# Patient Record
Sex: MALE | Race: BLACK OR AFRICAN AMERICAN | NOT HISPANIC OR LATINO | Employment: OTHER | URBAN - METROPOLITAN AREA
[De-identification: names, ages, dates, MRNs, and addresses within clinical notes are randomized per-mention and may not be internally consistent; named-entity substitution may affect disease eponyms.]

---

## 2019-10-31 DIAGNOSIS — M62.81 MUSCLE WEAKNESS (GENERALIZED): Primary | ICD-10-CM

## 2020-08-07 ENCOUNTER — TELEPHONE (OUTPATIENT)
Dept: NEUROLOGY | Facility: CLINIC | Age: 50
End: 2020-08-07

## 2020-08-07 NOTE — TELEPHONE ENCOUNTER
----- Message from Malathi Hagen sent at 8/7/2020 11:12 AM CDT -----  Patient states referral for neurology was sent over past Monday or Tuesday - doesn't show in computer yet    He is asking if one has been received  - says was not sent to a designated physician only to Neurology department      Patient can be reached @# [phone]

## 2020-10-12 ENCOUNTER — TELEPHONE (OUTPATIENT)
Dept: NEUROLOGY | Facility: CLINIC | Age: 50
End: 2020-10-12

## 2020-10-12 NOTE — TELEPHONE ENCOUNTER
Spoke with patient, appointment rescheduled to 11/4/20 at 1300 hours.  He informed me tremors are mostly in left hand after suffering with a stroke.  If he holds a glass he can see it more.  Confirmed date/time with patient.   107

## 2020-10-12 NOTE — TELEPHONE ENCOUNTER
----- Message from Mima Cai sent at 10/12/2020 11:22 AM CDT -----  Type: Patient Call Back    Who called: self    What is the request in detail: pt was calling to get address and I noticed that a reschedule message was in. I can't reschedule because it's not giving me any appts    Can the clinic reply by [facility]?no    Would the patient rather a call back or a response via [facility]? call    Best call back number

## 2020-11-03 ENCOUNTER — HOSPITAL ENCOUNTER (OUTPATIENT)
Facility: HOSPITAL | Age: 50
Discharge: HOME | End: 2020-11-05
Attending: EMERGENCY MEDICINE | Admitting: SURGERY
Payer: MEDICARE

## 2020-11-03 DIAGNOSIS — S36.112A LIVER HEMATOMA, INITIAL ENCOUNTER: ICD-10-CM
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
## 2020-11-03 DIAGNOSIS — S22.41XA CLOSED FRACTURE OF MULTIPLE RIBS OF RIGHT SIDE, INITIAL ENCOUNTER: Primary | ICD-10-CM

## 2020-11-03 DIAGNOSIS — S27.2XXA TRAUMATIC PNEUMOHEMOTHORAX, INITIAL ENCOUNTER: ICD-10-CM
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
## 2020-11-03 DIAGNOSIS — Z86.73 HISTORY OF STROKE: ICD-10-CM

## 2020-11-03 LAB
ANION GAP SERPL CALC-SCNC: 10 MMOL/L (ref 8–16)
BASOPHILS # BLD AUTO: 0.01 K/UL (ref 0–0.2)
BASOPHILS NFR BLD: 0.1 % (ref 0–1.9)
BUN SERPL-MCNC: 16 MG/DL (ref 6–20)
CALCIUM SERPL-MCNC: 9.5 MG/DL (ref 8.7–10.5)
CHLORIDE SERPL-SCNC: 104 MMOL/L (ref 95–110)
CO2 SERPL-SCNC: 27 MMOL/L (ref 23–29)
CREAT SERPL-MCNC: 1 MG/DL (ref 0.5–1.4)
DIFFERENTIAL METHOD: ABNORMAL
EOSINOPHIL # BLD AUTO: 0 K/UL (ref 0–0.5)
EOSINOPHIL NFR BLD: 0.5 % (ref 0–8)
ERYTHROCYTE [DISTWIDTH] IN BLOOD BY AUTOMATED COUNT: 16.2 % (ref 11.5–14.5)
EST. GFR  (AFRICAN AMERICAN): >60 ML/MIN/1.73 M^2
EST. GFR  (NON AFRICAN AMERICAN): >60 ML/MIN/1.73 M^2
GLUCOSE SERPL-MCNC: 113 MG/DL (ref 70–110)
HCT VFR BLD AUTO: 39.4 % (ref 40–54)
HGB BLD-MCNC: 12.3 G/DL (ref 14–18)
IMM GRANULOCYTES # BLD AUTO: 0.04 K/UL (ref 0–0.04)
IMM GRANULOCYTES NFR BLD AUTO: 0.5 % (ref 0–0.5)
LYMPHOCYTES # BLD AUTO: 0.6 K/UL (ref 1–4.8)
LYMPHOCYTES NFR BLD: 7.8 % (ref 18–48)
MCH RBC QN AUTO: 24.5 PG (ref 27–31)
MCHC RBC AUTO-ENTMCNC: 31.2 G/DL (ref 32–36)
MCV RBC AUTO: 79 FL (ref 82–98)
MONOCYTES # BLD AUTO: 0.6 K/UL (ref 0.3–1)
MONOCYTES NFR BLD: 7.2 % (ref 4–15)
NEUTROPHILS # BLD AUTO: 6.5 K/UL (ref 1.8–7.7)
NEUTROPHILS NFR BLD: 83.9 % (ref 38–73)
NRBC BLD-RTO: 0 /100 WBC
PLATELET # BLD AUTO: 227 K/UL (ref 150–350)
PMV BLD AUTO: 11.7 FL (ref 9.2–12.9)
POTASSIUM SERPL-SCNC: 3.5 MMOL/L (ref 3.5–5.1)
RBC # BLD AUTO: 5.02 M/UL (ref 4.6–6.2)
SODIUM SERPL-SCNC: 141 MMOL/L (ref 136–145)
WBC # BLD AUTO: 7.69 K/UL (ref 3.9–12.7)

## 2020-11-03 PROCEDURE — 99285 EMERGENCY DEPT VISIT HI MDM: CPT | Mod: ,,, | Performed by: PHYSICIAN ASSISTANT

## 2020-11-03 PROCEDURE — 94761 N-INVAS EAR/PLS OXIMETRY MLT: CPT | Mod: HCWC

## 2020-11-03 PROCEDURE — 96374 THER/PROPH/DIAG INJ IV PUSH: CPT | Mod: HCWC,59

## 2020-11-03 PROCEDURE — 99285 PR EMERGENCY DEPT VISIT,LEVEL V: ICD-10-PCS | Mod: ,,, | Performed by: PHYSICIAN ASSISTANT

## 2020-11-03 PROCEDURE — 99900035 HC TECH TIME PER 15 MIN (STAT): Mod: HCWC

## 2020-11-03 PROCEDURE — 94799 UNLISTED PULMONARY SVC/PX: CPT | Mod: HCWC

## 2020-11-03 PROCEDURE — 63600175 PHARM REV CODE 636 W HCPCS: Mod: HCWC | Performed by: PHYSICIAN ASSISTANT

## 2020-11-03 PROCEDURE — 85025 COMPLETE CBC W/AUTO DIFF WBC: CPT | Mod: HCWC

## 2020-11-03 PROCEDURE — 80048 BASIC METABOLIC PNL TOTAL CA: CPT | Mod: HCWC

## 2020-11-03 PROCEDURE — 99285 EMERGENCY DEPT VISIT HI MDM: CPT | Mod: 25,HCWC

## 2020-11-03 RX ORDER — LIDOCAINE 50 MG/G
1 PATCH TOPICAL
Status: COMPLETED | OUTPATIENT
Start: 2020-11-03 | End: 2020-11-04

## 2020-11-03 RX ORDER — MORPHINE SULFATE 4 MG/ML
4 INJECTION, SOLUTION INTRAMUSCULAR; INTRAVENOUS
Status: COMPLETED | OUTPATIENT
Start: 2020-11-03 | End: 2020-11-03

## 2020-11-03 RX ADMIN — IOHEXOL 100 ML: 350 INJECTION, SOLUTION INTRAVENOUS at 11:11

## 2020-11-03 RX ADMIN — MORPHINE SULFATE 4 MG: 4 INJECTION INTRAVENOUS at 10:11

## 2020-11-04 PROBLEM — S22.41XA MULTIPLE CLOSED FRACTURES OF RIBS OF RIGHT SIDE: Status: ACTIVE | Noted: 2020-11-04

## 2020-11-04 PROBLEM — S22.41XA CLOSED FRACTURE OF MULTIPLE RIBS OF RIGHT SIDE: Status: ACTIVE | Noted: 2020-11-04

## 2020-11-04 LAB
BILIRUB UR QL STRIP: NEGATIVE
CLARITY UR REFRACT.AUTO: CLEAR
COLOR UR AUTO: YELLOW
CTP QC/QA: YES
GLUCOSE UR QL STRIP: NEGATIVE
HGB UR QL STRIP: NEGATIVE
KETONES UR QL STRIP: ABNORMAL
LEUKOCYTE ESTERASE UR QL STRIP: NEGATIVE
NITRITE UR QL STRIP: NEGATIVE
PH UR STRIP: 5 [PH] (ref 5–8)
PROT UR QL STRIP: NEGATIVE
SARS-COV-2 RDRP RESP QL NAA+PROBE: NEGATIVE
SP GR UR STRIP: >1.03 (ref 1–1.03)
URN SPEC COLLECT METH UR: ABNORMAL

## 2020-11-04 PROCEDURE — 27100171 HC OXYGEN HIGH FLOW UP TO 24 HOURS: Mod: HCWC

## 2020-11-04 PROCEDURE — 94761 N-INVAS EAR/PLS OXIMETRY MLT: CPT | Mod: HCWC

## 2020-11-04 PROCEDURE — 99218 PR INITIAL OBSERVATION CARE,LEVL I: CPT | Mod: HCWC,,, | Performed by: SURGERY

## 2020-11-04 PROCEDURE — 96361 HYDRATE IV INFUSION ADD-ON: CPT | Mod: HCWC

## 2020-11-04 PROCEDURE — G0378 HOSPITAL OBSERVATION PER HR: HCPCS | Mod: HCWC

## 2020-11-04 PROCEDURE — 96372 THER/PROPH/DIAG INJ SC/IM: CPT

## 2020-11-04 PROCEDURE — 27000221 HC OXYGEN, UP TO 24 HOURS: Mod: HCWC

## 2020-11-04 PROCEDURE — 94664 DEMO&/EVAL PT USE INHALER: CPT | Mod: HCWC

## 2020-11-04 PROCEDURE — 99900035 HC TECH TIME PER 15 MIN (STAT): Mod: HCWC

## 2020-11-04 PROCEDURE — 25000003 PHARM REV CODE 250: Mod: HCWC | Performed by: PHYSICIAN ASSISTANT

## 2020-11-04 PROCEDURE — 27000646 HC AEROBIKA DEVICE: Mod: HCWC

## 2020-11-04 PROCEDURE — 96375 TX/PRO/DX INJ NEW DRUG ADDON: CPT

## 2020-11-04 PROCEDURE — 99218 PR INITIAL OBSERVATION CARE,LEVL I: ICD-10-PCS | Mod: HCWC,,, | Performed by: SURGERY

## 2020-11-04 PROCEDURE — 25000003 PHARM REV CODE 250: Mod: HCWC | Performed by: STUDENT IN AN ORGANIZED HEALTH CARE EDUCATION/TRAINING PROGRAM

## 2020-11-04 PROCEDURE — 25500020 PHARM REV CODE 255: Mod: HCWC | Performed by: EMERGENCY MEDICINE

## 2020-11-04 PROCEDURE — 63600175 PHARM REV CODE 636 W HCPCS: Mod: HCWC | Performed by: STUDENT IN AN ORGANIZED HEALTH CARE EDUCATION/TRAINING PROGRAM

## 2020-11-04 PROCEDURE — 81003 URINALYSIS AUTO W/O SCOPE: CPT | Mod: HCWC

## 2020-11-04 PROCEDURE — U0002 COVID-19 LAB TEST NON-CDC: HCPCS | Mod: HCWC | Performed by: PHYSICIAN ASSISTANT

## 2020-11-04 RX ORDER — OXYCODONE HYDROCHLORIDE 10 MG/1
10 TABLET ORAL EVERY 4 HOURS PRN
Status: DISCONTINUED | OUTPATIENT
Start: 2020-11-04 | End: 2020-11-05 | Stop reason: HOSPADM

## 2020-11-04 RX ORDER — SODIUM CHLORIDE 0.9 % (FLUSH) 0.9 %
10 SYRINGE (ML) INJECTION
Status: DISCONTINUED | OUTPATIENT
Start: 2020-11-04 | End: 2020-11-05 | Stop reason: HOSPADM

## 2020-11-04 RX ORDER — OXYCODONE HYDROCHLORIDE 5 MG/1
5 TABLET ORAL EVERY 4 HOURS PRN
Status: DISCONTINUED | OUTPATIENT
Start: 2020-11-04 | End: 2020-11-05 | Stop reason: HOSPADM

## 2020-11-04 RX ORDER — ACETAMINOPHEN 325 MG/1
650 TABLET ORAL EVERY 8 HOURS PRN
Status: DISCONTINUED | OUTPATIENT
Start: 2020-11-04 | End: 2020-11-05 | Stop reason: HOSPADM

## 2020-11-04 RX ORDER — SODIUM CHLORIDE, SODIUM LACTATE, POTASSIUM CHLORIDE, CALCIUM CHLORIDE 600; 310; 30; 20 MG/100ML; MG/100ML; MG/100ML; MG/100ML
INJECTION, SOLUTION INTRAVENOUS CONTINUOUS
Status: DISCONTINUED | OUTPATIENT
Start: 2020-11-04 | End: 2020-11-04

## 2020-11-04 RX ORDER — GABAPENTIN 300 MG/1
300 CAPSULE ORAL 3 TIMES DAILY
Status: DISCONTINUED | OUTPATIENT
Start: 2020-11-04 | End: 2020-11-05 | Stop reason: HOSPADM

## 2020-11-04 RX ORDER — DEXTROSE MONOHYDRATE, SODIUM CHLORIDE, AND POTASSIUM CHLORIDE 50; 1.49; 4.5 G/1000ML; G/1000ML; G/1000ML
INJECTION, SOLUTION INTRAVENOUS CONTINUOUS
Status: DISCONTINUED | OUTPATIENT
Start: 2020-11-04 | End: 2020-11-04

## 2020-11-04 RX ORDER — ENOXAPARIN SODIUM 100 MG/ML
30 INJECTION SUBCUTANEOUS EVERY 12 HOURS
Status: DISCONTINUED | OUTPATIENT
Start: 2020-11-04 | End: 2020-11-05 | Stop reason: HOSPADM

## 2020-11-04 RX ORDER — ONDANSETRON 8 MG/1
8 TABLET, ORALLY DISINTEGRATING ORAL EVERY 8 HOURS PRN
Status: DISCONTINUED | OUTPATIENT
Start: 2020-11-04 | End: 2020-11-05 | Stop reason: HOSPADM

## 2020-11-04 RX ORDER — ACETAMINOPHEN 325 MG/1
650 TABLET ORAL EVERY 6 HOURS
Status: DISCONTINUED | OUTPATIENT
Start: 2020-11-04 | End: 2020-11-05 | Stop reason: HOSPADM

## 2020-11-04 RX ORDER — TALC
6 POWDER (GRAM) TOPICAL NIGHTLY PRN
Status: DISCONTINUED | OUTPATIENT
Start: 2020-11-04 | End: 2020-11-05 | Stop reason: HOSPADM

## 2020-11-04 RX ORDER — KETOROLAC TROMETHAMINE 10 MG/1
10 TABLET, FILM COATED ORAL EVERY 6 HOURS
Status: DISCONTINUED | OUTPATIENT
Start: 2020-11-04 | End: 2020-11-05 | Stop reason: HOSPADM

## 2020-11-04 RX ORDER — METHOCARBAMOL 500 MG/1
500 TABLET, FILM COATED ORAL 3 TIMES DAILY
Status: DISCONTINUED | OUTPATIENT
Start: 2020-11-04 | End: 2020-11-05 | Stop reason: HOSPADM

## 2020-11-04 RX ADMIN — ACETAMINOPHEN 650 MG: 325 TABLET ORAL at 11:11

## 2020-11-04 RX ADMIN — GABAPENTIN 300 MG: 300 CAPSULE ORAL at 08:11

## 2020-11-04 RX ADMIN — ACETAMINOPHEN 650 MG: 325 TABLET ORAL at 07:11

## 2020-11-04 RX ADMIN — ENOXAPARIN SODIUM 30 MG: 30 INJECTION SUBCUTANEOUS at 08:11

## 2020-11-04 RX ADMIN — ACETAMINOPHEN 650 MG: 325 TABLET ORAL at 05:11

## 2020-11-04 RX ADMIN — METHOCARBAMOL 500 MG: 500 TABLET ORAL at 08:11

## 2020-11-04 RX ADMIN — LIDOCAINE 1 PATCH: 50 PATCH TOPICAL at 12:11

## 2020-11-04 RX ADMIN — ENOXAPARIN SODIUM 30 MG: 30 INJECTION SUBCUTANEOUS at 09:11

## 2020-11-04 RX ADMIN — GABAPENTIN 300 MG: 300 CAPSULE ORAL at 04:11

## 2020-11-04 RX ADMIN — METHOCARBAMOL 500 MG: 500 TABLET ORAL at 09:11

## 2020-11-04 RX ADMIN — SODIUM CHLORIDE, SODIUM LACTATE, POTASSIUM CHLORIDE, AND CALCIUM CHLORIDE: .6; .31; .03; .02 INJECTION, SOLUTION INTRAVENOUS at 04:11

## 2020-11-04 RX ADMIN — KETOROLAC TROMETHAMINE 10 MG: 10 TABLET, FILM COATED ORAL at 11:11

## 2020-11-04 RX ADMIN — KETOROLAC TROMETHAMINE 10 MG: 10 TABLET, FILM COATED ORAL at 05:11

## 2020-11-04 RX ADMIN — POTASSIUM CHLORIDE, DEXTROSE MONOHYDRATE AND SODIUM CHLORIDE: 150; 5; 450 INJECTION, SOLUTION INTRAVENOUS at 08:11

## 2020-11-04 RX ADMIN — METHOCARBAMOL 500 MG: 500 TABLET ORAL at 04:11

## 2020-11-04 RX ADMIN — SODIUM CHLORIDE 1000 ML: 0.9 INJECTION, SOLUTION INTRAVENOUS at 12:11

## 2020-11-04 RX ADMIN — METHOCARBAMOL 500 MG: 100 INJECTION INTRAMUSCULAR; INTRAVENOUS at 04:11

## 2020-11-04 RX ADMIN — GABAPENTIN 300 MG: 300 CAPSULE ORAL at 09:11

## 2020-11-04 NOTE — CONSULTS
[facility]  General Surgery  Consult Note    Patient Name: Jalen Iniguez  MRN: [id number]  Code Status: Full Code  Admission Date: 11/3/2020  Hospital Length of Stay: 0 days  Attending Physician: No att. providers found  Primary Care Provider: Primary Doctor No    Patient information was obtained from patient, past medical records and ER records.     Inpatient consult to General surgery  Consult performed by: Ajit Whittington MD  Consult ordered by: Ajit Whittington MD  Reason for consult: Rib fractures        Subjective:     Principal Problem: Multiple closed fractures of ribs of right side    History of Present Illness: Patient is 49 yo M with history of HTN and CVA (2017) with mild residual right sided weakness who presented to ED tonight after falling getting out of bath tube and developing right sided chest pain. Denies any head trauma or LOC. Denies any other injuries. Is not on blood thinners.    CT A/P showed a very low volume right hemopneumothorax    Patient cares for himself and his mother at home    Hx of lumbar spinal surgery  Chris any abd or throacic surgeries    No current facility-administered medications on file prior to encounter.      Current Outpatient Medications on File Prior to Encounter   Medication Sig    oxycodone-acetaminophen 5-325 mg (PERCOCET) 5-325 mg per tablet Take 1 tablet by mouth every 4 (four) hours as needed for Pain (no driving or operating machinery on this medication).    permethrin (ELIMITE) 5 % cream Apply to affected entire body once at night before bed - leave on 8 hours then shower off       Review of patient's allergies indicates:  No Known Allergies    Past Medical History:   Diagnosis Date    Psoriasis     Stroke 04/2017     Past Surgical History:   Procedure Laterality Date    KNEE SURGERY       Family History     None        Tobacco Use    Smoking status: Current Every Day Smoker     Packs/day: 1.00     Types: Cigarettes   Substance  and Sexual Activity    Alcohol use: No    Drug use: No    Sexual activity: Not on file     Review of Systems   Constitutional: Negative for chills and fever.   HENT: Negative for sinus pain and trouble swallowing.    Eyes: Negative for discharge and redness.   Respiratory: Negative for cough and shortness of breath.    Cardiovascular: Positive for chest pain.   Gastrointestinal: Negative for abdominal distention, abdominal pain, constipation, diarrhea, nausea and vomiting.   Genitourinary: Negative for dysuria.   Musculoskeletal: Negative for back pain and neck pain.   Skin: Negative for color change.   Allergic/Immunologic: Negative for immunocompromised state.   Neurological: Positive for weakness. Negative for light-headedness and headaches.   Hematological: Negative for adenopathy.     Objective:     Vital Signs (Most Recent):  Temp: 98.8 °F (37.1 °C) (11/04/20 0202)  Pulse: 97 (11/04/20 0202)  Resp: (!) 21 (11/04/20 0131)  BP: 134/76 (11/04/20 0202)  SpO2: 100 % (11/04/20 0202) Vital Signs (24h Range):  Temp:  [97.9 °F (36.6 °C)-98.8 °F (37.1 °C)] 98.8 °F (37.1 °C)  Pulse:  [84-97] 97  Resp:  [16-21] 21  SpO2:  [99 %-100 %] 100 %  BP: (134-142)/(62-76) 134/76     Weight: 108.9 kg (240 lb)  Body mass index is 30.81 kg/m².    Physical Exam  Vitals signs and nursing note reviewed.   Constitutional:       General: He is not in acute distress.     Appearance: Normal appearance. He is well-developed. He is not ill-appearing, toxic-appearing or diaphoretic.   HENT:      Head: Normocephalic and atraumatic.      Right Ear: External ear normal.      Left Ear: External ear normal.   Eyes:      Extraocular Movements: Extraocular movements intact.      Pupils: Pupils are equal, round, and reactive to light.   Neck:      Musculoskeletal: Normal range of motion and neck supple.   Cardiovascular:      Rate and Rhythm: Normal rate and regular rhythm.      Pulses: Normal pulses.   Pulmonary:      Effort: Pulmonary effort is  normal. No respiratory distress.      Comments: Right lower chest wall tenderness, no crepitus or flail chest noted  Chest:      Chest wall: Tenderness present.   Abdominal:      General: Abdomen is flat. There is no distension.      Palpations: Abdomen is soft.      Tenderness: There is no abdominal tenderness. There is no guarding.      Comments: No bruising noted, abd soft, non tender, non distended   Musculoskeletal: Normal range of motion.         General: No swelling, tenderness, deformity or signs of injury.      Comments: RUE/RLE 4/5 weakness   Skin:     General: Skin is warm and dry.   Neurological:      General: No focal deficit present.      Mental Status: He is alert and oriented to person, place, and time. Mental status is at baseline.      Cranial Nerves: No cranial nerve deficit.      Sensory: No sensory deficit.      Comments: No spinal tenderness noted throughout   Psychiatric:         Mood and Affect: Mood normal.         Behavior: Behavior normal.         Thought Content: Thought content normal.         Judgment: Judgment normal.         Significant Labs:  CBC:   Recent Labs   Lab 11/03/20  2247   WBC 7.69   RBC 5.02   HGB 12.3*   HCT 39.4*      MCV 79*   MCH 24.5*   MCHC 31.2*     CMP:   Recent Labs   Lab 11/03/20  2247   *   CALCIUM 9.5      K 3.5   CO2 27      BUN 16   CREATININE 1.0       Significant Diagnostics:  I have reviewed all pertinent imaging results/findings within the past 24 hours.   CT reviewed showed displaced rib fractures of right ribs 8-10 all with anterior/lateral and posterior rib fractures, as well as single non-displaced fractures of ribs 7 and 11, very small volume hemopneumothorax on right, no solid organ injury or free fluid or air noted in abd    Assessment/Plan:     * Multiple closed fractures of ribs of right side  Patient is 49 yo M with right rib fractures 7-11 with ribs 8-10 having two fracture points and jacky displacement at the  anterior/lateral fracture    Will admit to general surgery  CT chest non con ordered with 3D reconstruction to eval for other fractures  NPO  IVF  Multimodal pain control  Will check another CXR in the AM to eval for any pneumo         VTE Risk Mitigation (From admission, onward)         Ordered     enoxaparin injection 30 mg  Every 12 hours      11/04/20 0246     Place REMY hose  Until discontinued      11/04/20 0246     IP VTE HIGH RISK PATIENT  Once      11/04/20 0246     Place sequential compression device  Until discontinued      11/04/20 0246                Thank you for your consult. I will follow-up with patient. Please contact us if you have any additional questions.    Ajit Whittington MD  General Surgery  [facility]

## 2020-11-04 NOTE — PLAN OF CARE
CM to bedside - pt provided assessment info. Pt w/ rollator and shower chair in place, lives w/ mother. Pt will likely d/c home w/ no needs but may benefit from HH. Pt lives in a 1 story w/ 4 steps to enter and reports having no pets in the home. Pt reports a past stroke in April 2017. Pt's PCP added to chart. Pt uses [employer] rides for appts and states he has 1 left for the year. Pt's reports having a Neuro appt today w/ Dr Garry Robles at [facility] - no appt noted but there is an appt for 12/16 so it may have already been changed d/t admission. Pt also stated he has a dentist appt tmw at [facility] in [place] and any rescheduled appt is fine - CM contacted clinic and pt's new appt is 11/9 and placed in follow up. Pt does reports mod pain to his side and is currently on 4L o2.    CM provided patient anticipated NAIF which was written on the whiteboard and will be update by nursing staff.   Patient provided a Discharge Planning booklet. Patient verbalized understanding.    ALBA PEARCE [id number] - covering 5th floor ALBA Manjarrez [id number]     11/04/20 1405   Discharge Assessment   Assessment Type Discharge Planning Assessment   Confirmed/corrected address and phone number on facesheet? Yes   Assessment information obtained from? Patient   Expected Length of Stay (days) 2   Communicated expected length of stay with patient/caregiver yes   Prior to hospitilization cognitive status: Alert/Oriented   Prior to hospitalization functional status: Assistive Equipment   Current cognitive status: Alert/Oriented   Current Functional Status: Assistive Equipment;Needs Assistance   Facility Arrived From: N/A   Lives With parent(s)   Able to Return to Prior Arrangements yes   Is patient able to care for self after discharge? Unable to determine at this time (comments)   Who are your caregiver(s) and their phone number(s)? mother - Ankita [phone]; sister - Tamica [phone]   Patient's perception of discharge disposition home or  selfcare   Readmission Within the Last 30 Days no previous admission in last 30 days   Patient currently being followed by outpatient case management? No   Patient currently receives any other outside agency services? No   Equipment Currently Used at Home shower chair;rollator   Do you have any problems affording any of your prescribed medications? No   Is the patient taking medications as prescribed? yes   Does the patient have transportation home? Yes  (pt's sister will pickup pt at d/c)   Transportation Anticipated health plan transportation;family or friend will provide  ([employer] plan rides)   Dialysis Name and Scheduled days N/A   Does the patient receive services at the [facility]? No   Discharge Plan A Home with family   Discharge Plan B Home with family;Home Health   DME Needed Upon Discharge  other (see comments)  (TBD)   Patient/Family in Agreement with Plan yes

## 2020-11-04 NOTE — PLAN OF CARE
11/04/20 1403   Post-Acute Status   Post-Acute Authorization Other   Other Status No Post-Acute Service Needs   Discharge Delays None known at this time   Discharge Plan   Discharge Plan A Home with family   Discharge Plan B Home with family;Home Health

## 2020-11-04 NOTE — SUBJECTIVE & OBJECTIVE
No current facility-administered medications on file prior to encounter.      Current Outpatient Medications on File Prior to Encounter   Medication Sig    oxycodone-acetaminophen 5-325 mg (PERCOCET) 5-325 mg per tablet Take 1 tablet by mouth every 4 (four) hours as needed for Pain (no driving or operating machinery on this medication).    permethrin (ELIMITE) 5 % cream Apply to affected entire body once at night before bed - leave on 8 hours then shower off       Review of patient's allergies indicates:  No Known Allergies    Past Medical History:   Diagnosis Date    Psoriasis     Stroke 04/2017     Past Surgical History:   Procedure Laterality Date    KNEE SURGERY       Family History     None        Tobacco Use    Smoking status: Current Every Day Smoker     Packs/day: 1.00     Types: Cigarettes   Substance and Sexual Activity    Alcohol use: No    Drug use: No    Sexual activity: Not on file     Review of Systems   Constitutional: Negative for chills and fever.   HENT: Negative for sinus pain and trouble swallowing.    Eyes: Negative for discharge and redness.   Respiratory: Negative for cough and shortness of breath.    Cardiovascular: Positive for chest pain.   Gastrointestinal: Negative for abdominal distention, abdominal pain, constipation, diarrhea, nausea and vomiting.   Genitourinary: Negative for dysuria.   Musculoskeletal: Negative for back pain and neck pain.   Skin: Negative for color change.   Allergic/Immunologic: Negative for immunocompromised state.   Neurological: Positive for weakness. Negative for light-headedness and headaches.   Hematological: Negative for adenopathy.     Objective:     Vital Signs (Most Recent):  Temp: 98.8 °F (37.1 °C) (11/04/20 0202)  Pulse: 97 (11/04/20 0202)  Resp: (!) 21 (11/04/20 0131)  BP: 134/76 (11/04/20 0202)  SpO2: 100 % (11/04/20 0202) Vital Signs (24h Range):  Temp:  [97.9 °F (36.6 °C)-98.8 °F (37.1 °C)] 98.8 °F (37.1 °C)  Pulse:  [84-97] 97  Resp:   [16-21] 21  SpO2:  [99 %-100 %] 100 %  BP: (134-142)/(62-76) 134/76     Weight: 108.9 kg (240 lb)  Body mass index is 30.81 kg/m².    Physical Exam  Vitals signs and nursing note reviewed.   Constitutional:       General: He is not in acute distress.     Appearance: Normal appearance. He is well-developed. He is not ill-appearing, toxic-appearing or diaphoretic.   HENT:      Head: Normocephalic and atraumatic.      Right Ear: External ear normal.      Left Ear: External ear normal.   Eyes:      Extraocular Movements: Extraocular movements intact.      Pupils: Pupils are equal, round, and reactive to light.   Neck:      Musculoskeletal: Normal range of motion and neck supple.   Cardiovascular:      Rate and Rhythm: Normal rate and regular rhythm.      Pulses: Normal pulses.   Pulmonary:      Effort: Pulmonary effort is normal. No respiratory distress.      Comments: Right lower chest wall tenderness, no crepitus or flail chest noted  Chest:      Chest wall: Tenderness present.   Abdominal:      General: Abdomen is flat. There is no distension.      Palpations: Abdomen is soft.      Tenderness: There is no abdominal tenderness. There is no guarding.      Comments: No bruising noted, abd soft, non tender, non distended   Musculoskeletal: Normal range of motion.         General: No swelling, tenderness, deformity or signs of injury.      Comments: RUE/RLE 4/5 weakness   Skin:     General: Skin is warm and dry.   Neurological:      General: No focal deficit present.      Mental Status: He is alert and oriented to person, place, and time. Mental status is at baseline.      Cranial Nerves: No cranial nerve deficit.      Sensory: No sensory deficit.      Comments: No spinal tenderness noted throughout   Psychiatric:         Mood and Affect: Mood normal.         Behavior: Behavior normal.         Thought Content: Thought content normal.         Judgment: Judgment normal.         Significant Labs:  CBC:   Recent Labs   Lab  11/03/20  2247   WBC 7.69   RBC 5.02   HGB 12.3*   HCT 39.4*      MCV 79*   MCH 24.5*   MCHC 31.2*     CMP:   Recent Labs   Lab 11/03/20  2247   *   CALCIUM 9.5      K 3.5   CO2 27      BUN 16   CREATININE 1.0       Significant Diagnostics:  I have reviewed all pertinent imaging results/findings within the past 24 hours.   CT reviewed showed displaced rib fractures of right ribs 8-10 all with anterior/lateral and posterior rib fractures, as well as single non-displaced fractures of ribs 7 and 11, very small volume hemopneumothorax on right, no solid organ injury or free fluid or air noted in abd

## 2020-11-04 NOTE — ED PROVIDER NOTES
Encounter Date: 11/3/2020       History     Chief Complaint   Patient presents with    Fall     Pt arrives via EMS after falling out of shower. Pt c/o right side and rib pain. Hx of stroke with right sided deficit.     50-year-old male with past medical history of hypertension, hyperlipidemia, history of CVA in 2017 with residual right sided weakness presents the emergency department with complaints of right-sided rib pain, right flank and abdominal pain that began after ground level slip and fall after attempting to get out of the bath tub tonight. Denies symptoms prior to fall.  Denies head trauma or loss of consciousness.  Reports that he was able to call his neighbor who helped him off the floor.  He then called 911 to bring him to the emergency department.  He has not taken any medication for the pain.  He does report that pain is worse with deep inspiration.  Denies anterior chest pain, nausea, vomiting, diarrhea, dysuria, hematuria, saddle anesthesia, bowel or bladder incontinence, worsening lower extremity weakness, numbness, tingling.  Reports that he has been able to ambulate since.  Denies other  worsening or alleviating factors.        Review of patient's allergies indicates:  No Known Allergies  Past Medical History:   Diagnosis Date    Psoriasis     Stroke 04/2017     Past Surgical History:   Procedure Laterality Date    KNEE SURGERY       History reviewed. No pertinent family history.  Social History     Tobacco Use    Smoking status: Current Every Day Smoker     Packs/day: 1.00     Types: Cigarettes   Substance Use Topics    Alcohol use: No    Drug use: No     Review of Systems   Constitutional: Negative for fever.   HENT: Negative for sore throat.    Respiratory: Negative for shortness of breath.    Cardiovascular: Negative for chest pain.        Rib pain   Gastrointestinal: Positive for abdominal pain. Negative for nausea.   Genitourinary: Positive for flank pain. Negative for dysuria.    Musculoskeletal: Positive for back pain.   Skin: Negative for rash.   Neurological: Negative for weakness.   Hematological: Does not bruise/bleed easily.       Physical Exam     Initial Vitals [11/03/20 2134]   BP Pulse Resp Temp SpO2   (!) 142/62 90 16 97.9 °F (36.6 °C) 99 %      MAP       --         Physical Exam    Nursing note and vitals reviewed.  Constitutional: He appears well-developed and well-nourished. He is not diaphoretic. No distress.   HENT:   Head: Normocephalic and atraumatic.   Mouth/Throat: Oropharynx is clear and moist.   Eyes: EOM are normal. Pupils are equal, round, and reactive to light.   Neck: Normal range of motion. Neck supple.   Cardiovascular: Normal rate, regular rhythm, normal heart sounds and intact distal pulses. Exam reveals no gallop and no friction rub.    No murmur heard.  Pulmonary/Chest: Breath sounds normal. He has no wheezes. He has no rhonchi. He has no rales. He exhibits tenderness (right lateral, posterior lower ribs).   Abdominal: Soft. Bowel sounds are normal. There is abdominal tenderness in the right upper quadrant and right lower quadrant. There is CVA tenderness.   Musculoskeletal: Normal range of motion.   Neurological: He is alert and oriented to person, place, and time. No cranial nerve deficit or sensory deficit. GCS score is 15. GCS eye subscore is 4. GCS verbal subscore is 5. GCS motor subscore is 6.   Decreased strength 4/5 RLE compared to LLE.   No midline TTP cervical, thoracic, lumbar spine.   Full ROM jena hips. No pain with internal or external ROM.    Skin: Skin is warm and dry. Capillary refill takes less than 2 seconds.   Diffuse eczematous skin changes to abdomen and flank, no evidence of ecchymosis.    Psychiatric: He has a normal mood and affect. His behavior is normal. Judgment and thought content normal.         ED Course   Procedures  Labs Reviewed   CBC W/ AUTO DIFFERENTIAL - Abnormal; Notable for the following components:       Result Value     Hemoglobin 12.3 (*)     Hematocrit 39.4 (*)     MCV 79 (*)     MCH 24.5 (*)     MCHC 31.2 (*)     RDW 16.2 (*)     Lymph # 0.6 (*)     Gran % 83.9 (*)     Lymph % 7.8 (*)     All other components within normal limits   BASIC METABOLIC PANEL - Abnormal; Notable for the following components:    Glucose 113 (*)     All other components within normal limits   SARS-COV-2 RDRP GENE    Narrative:     This test utilizes isothermal nucleic acid amplification   technology to detect the SARS-CoV-2 RdRp nucleic acid segment.   The analytical sensitivity (limit of detection) is 125 genome   equivalents/mL.   A POSITIVE result implies infection with the SARS-CoV-2 virus;   the patient is presumed to be contagious.     A NEGATIVE result means that SARS-CoV-2 nucleic acids are not   present above the limit of detection. A NEGATIVE result should be   treated as presumptive. It does not rule out the possibility of   COVID-19 and should not be the sole basis for treatment decisions.   If COVID-19 is strongly suspected based on clinical and exposure   history, re-testing using an alternate molecular assay should be   considered.   This test is only for use under the Food and Drug   Administration s Emergency Use Authorization (EUA).   Commercial kits are provided by [employer].   Performance characteristics of the EUA have been independently   verified by [facility] Department of   Pathology and Laboratory Medicine.   _________________________________________________________________   The authorized Fact Sheet for Healthcare Providers and the authorized Fact   Sheet for Patients of the ID NOW COVID-19 are available on the FDA   website:     https://www.fda.gov/media/545409/download  https://www.fda.gov/media/493474/download              Imaging Results          CT Chest Without Contrast (Final result)  Result time 11/04/20 03:30:44    Final result by Andriy Ocasio MD (11/04/20 03:30:44)                  Impression:      Redemonstration of acute mildly displaced fractures of the posterior right 9 through 11th ribs and anterior right 7th through 10th ribs.  Mildly increased volume of right-sided pleural effusion likely representing hemothorax.  Redemonstration of trace pneumothorax.  Persistent subcutaneous emphysema along the dorsal and ventral soft tissues adjacent to the rib fractures.    Electronically signed by resident: Julain Castellanos  Date:    11/04/2020  Time:    03:11    Electronically signed by: Andriy Ocasio MD  Date:    11/04/2020  Time:    03:30             Narrative:    EXAMINATION:  CT CHEST WITHOUT CONTRAST    CLINICAL HISTORY:  Rib fracture suspected, traumatic;    TECHNIQUE:  Low dose axial images, sagittal and coronal reformations were obtained from the thoracic inlet to the lung bases. Contrast was not administered.    COMPARISON:  CT 11/03/2020    FINDINGS:  Base of Neck: No significant abnormality.    Thoracic soft tissues: Subcutaneous emphysema adjacent to the rib fractures described below.  Previously visualized small anterior abdominal wall hematoma not well delineated on this exam without intravenous contrast.    Aorta: Left-sided aortic arch with normal three vessel branching pattern.  Aorta maintain normal caliber, contour, and course.  No calcific atherosclerosis of the thoracic aorta.    Heart: Normal size with physiologic pericardial fluid.  No pericardial calcifications.    Pulmonary vasculature: Pulmonary arteries distribute normally.  There are four pulmonary veins.    Yudi/Mediastinum: No pathologic michelle enlargement.    Airways: Central airways are patent.    Lungs/Pleura: Redemonstration of right-sided pleural effusion which appears slightly increased in size when compared to prior exam and likely represents hemothorax.  There is associated compressive atelectasis of the right lower lobe.  Trace pneumothorax is again visualized.  Dependent atelectasis in the left lower  lobe.  No focal consolidations.    Esophagus: Normal.    Upper Abdomen: No solid organ injury of the upper abdomen.    Bones: Redemonstration of acute minimally displaced fractures of the posterior right 9th through 11th ribs and anterior right 7th through 10th ribs.                                CT Abdomen Pelvis With Contrast (Final result)  Result time 11/04/20 00:50:17    Final result by Andriy Ocasio MD (11/04/20 00:50:17)                 Impression:      Acute mildly displaced fractures of the posterior right 9th through 11th posterior ribs and the anterior right 7th through 10th ribs.  There is associated subcutaneous emphysema, trace pneumothorax, and small volume right-sided pleural effusion likely representing hematothorax adjacent to the posterior rib fractures and a small amount of fluid and trace air adjacent to the anterior rib fractures and the anterolateral margin of the right lobe of the liver likely representing small hematoma.  This appears to be located in the anterolateral lower right chest wall with mild mass effect on the adjacent liver.  The adjacent liver otherwise appears unremarkable    Cholelithiasis without evidence to suggest cholecystitis.    Partially imaged scrotum suggest right hydrocele.    This report was flagged in Epic as abnormal.    Electronically signed by resident: Julian Castellanos  Date:    11/04/2020  Time:    00:02    Electronically signed by: Andriy Ocasio MD  Date:    11/04/2020  Time:    00:50             Narrative:    EXAMINATION:  CT ABDOMEN PELVIS WITH CONTRAST    CLINICAL HISTORY:  Abdominal trauma, blunt;    TECHNIQUE:  The patient was surveyed from the lung bases through the pelvis after the administration of 75 cc Omni 350 IV contrast.  Oral contrast was administered. The data was reconstructed for coronal, sagittal, and axial images.    COMPARISON:  Chest radiograph 11/03/2020    FINDINGS:  CHEST:    Acute mildly displaced fractures of the posterior  right 9th through 11th posterior ribs and the anterior right 7th through 10th ribs. There is associated subcutaneous emphysema, trace pneumothorax, and small volume right-sided pleural effusion likely representing hematothorax adjacent to the posterior rib fractures and a small amount of fluid and trace air adjacent to the anterior rib fractures and the anterolateral margin of the right lobe of the liver likely representing small hematoma. No focal consolidation or pulmonary masses.    Heart: The visualized portions of the heart appear normal. No pericardial effusion.    ABDOMEN:    Liver: The liver is normal in size and attenuation.  No focal hepatic abnormality is seen.    Gallbladder/Bile ducts: Hyperdensity layering within the gallbladder consistent with cholelithiasis.  No evidence of pericholecystic fluid or significant wall thickening.  No intrahepatic or extrahepatic biliary ductal dilatation is identified.    Spleen:Unremarkable.    Stomach: Unremarkable.    Pancreas: Unremarkable.    Adrenals: Unremarkable.    Renal/Ureters: The kidneys are normal in size and location. No hydronephrosis.  The ureters appear normal in course and caliber. The urinary bladder is unremarkable.    Reproductive: Partially imaged scrotum suggest right hydrocele.    Bowel: The visualized loops of small and large bowel show no evidence of obstruction or inflammation.  Multiple compresses the bowel likely due to peristalsis.  Appendix is visualized without evidence of obstruction or surrounding inflammatory change.    Peritoneum: no ascites, free fluid, or intraperitoneal free air.    Lymph Nodes: No pathologic michelle enlargement in the abdomen or pelvis.    Aorta: The abdominal aorta is normal in course and caliber.  No  atherosclerotic calcifications.    Bones: Mild degenerative changes of the spine.  Otherwise, no significant abnormality.    Soft Tissues: the extraperitoneal soft tissues are unremarkable.                                X-Ray Chest PA And Lateral (Final result)  Result time 11/03/20 22:53:44    Final result by Andriy Ocasio MD (11/03/20 22:53:44)                 Impression:      No acute cardiopulmonary process.      Electronically signed by: Andriy Ocasio MD  Date:    11/03/2020  Time:    22:53             Narrative:    EXAMINATION:  XR CHEST PA AND LATERAL    CLINICAL HISTORY:  Unspecified fall, initial encounter    TECHNIQUE:  PA and lateral views of the chest were performed.    COMPARISON:  06/26/2013.    FINDINGS:  There is no consolidation, effusion, or pneumothorax.    Cardiomediastinal silhouette is unremarkable.    Partially visualized fixation hardware overlies the lower cervical spine.                               X-Ray Ribs 2 View Right (Final result)  Result time 11/03/20 22:54:59    Final result by Odin Aldridge DO (11/03/20 22:54:59)                 Impression:      Mildly displaced fracture of the lateral aspect of the right 10th rib.  Mildly displaced fractures of the posterior aspects of the right 9th and 10th ribs.      Electronically signed by: Odin Aldridge  Date:    11/03/2020  Time:    22:54             Narrative:    EXAMINATION:  XR RIBS 2 VIEW RIGHT    CLINICAL HISTORY:  Unspecified fall, initial encounter    TECHNIQUE:  Two views of the right ribs were performed.    COMPARISON:  Chest radiographs from 06/26/2013, 10/29/2010.    FINDINGS:  There is a mildly displaced fracture of the lateral aspect of the right 10th rib.  There are mildly displaced fractures of the posterior aspects of the right 9th and 10th ribs.  Partially imaged cervical spine hardware is noted.  Remaining visualized osseous structures are unremarkable.                              X-Rays:   Independently Interpreted Readings:   Chest X-Ray: Normal heart size.  No infiltrates.  No acute abnormalities. Lateral, posterior rib fracture      Medical Decision Making:   History:   Old Medical Records: I decided to obtain old  medical records.  Initial Assessment:   Emergent evaluation of a 50-year-old male who presents the emergency department after a mechanical trip and fall while attempting to get out of his bathtub tonight.  Reports striking the right side of his chest and abdomen on the edge of the tub.  Denies head trauma or loss of consciousness.  Patient is afebrile, hemodynamically stable, nontoxic appearing.  Will order labs, imaging, pain control and continue to monitor.  Differential Diagnosis:   Differential diagnosis includes but is not limited to rib contusion, rib fracture, pneumothorax, liver laceration, renal trauma.   Independently Interpreted Test(s):   I have ordered and independently interpreted X-rays - see prior notes.  Clinical Tests:   Lab Tests: Ordered and Reviewed  Radiological Study: Ordered and Reviewed  Medical Tests: Ordered and Reviewed  ED Management:  XRAY rib concerning for mildly displaced fracture of lateral aspect of right 10th rib and mildly displaced fracture to posterior aspect of right 9th and 10th ribs. Will order incentive spirometry.   No severe metabolic or hematologic derangements.  CT reveals acute mildly displaced fractures the posterior right 9th through 11th rib with associated subcutaneous emphysema and trace pneumothorax.  There is small volume right-sided pleural effusion which is likely a hemothorax as well as a small amount fluid and trace air the anterolateral margin of the right lobe the liver likely representing a small hematoma.  1:13 AM consult gen surgery.     Appreciate general surgery recommendations.  Will order CT chest without contrast to further assess pneumothorax.  Patient will be placed in observation with general surgery for pain control.  Patient is agreeable with this plan.  All questions answered.  The patient's history, physical exam, and plan of care was discussed with and agreed upon with my supervising physician.               Attending Attestation:      Physician Attestation Statement for NP/PA:   I have conducted a face to face encounter with this patient in addition to the NP/PA, due to Medical Complexity    Other NP/PA Attestation Additions:    History of Present Illness: 50-year-old man presents to the emergency department by EMS for evaluation of injury sustained in a slip and fall at home earlier today.                   ED Course as of Nov 05 1049   Tue Nov 03, 2020 2301 WBC: 7.69 [JM]   2301 Hemoglobin(!): 12.3 [JM]   2301 Hematocrit(!): 39.4 [JM]   2301 Platelets: 227 [JM]   2317 Glucose(!): 113 [JM]   2317 BUN: 16 [JM]   2317 Creatinine: 1.0 [JM]      ED Course User Index  [JM] Leonela Richard PA-C            Clinical Impression:       ICD-10-CM ICD-9-CM   1. Closed fracture of multiple ribs of right side, initial encounter  S22.41XA 807.09   2. Fall  W19.XXXA E888.9   3. Traumatic pneumohemothorax, initial encounter  S27.2XXA 860.4   4. Liver hematoma, initial encounter  S36.112A 864.01   5. Fall at home, initial encounter  W19.XXXA E888.9    Y92.009 E849.0   6. History of stroke  Z86.73 V12.54                          ED Disposition Condition    Observation                             Leonela Richard PA-C  11/04/20 0225       Adryan Harris MD  11/05/20 1049

## 2020-11-04 NOTE — CARE UPDATE
Rapid Response Respiratory Therapy Proactive Rounding Note      Time of visit: 09    Code Status: Full Code   : 1970  Bed: 501/501 A:   MRN: [id number]    SITUATION     Evaluated patient for: HFNC Compliance     BACKGROUND     Patient has a past medical history of Psoriasis and Stroke.    ASSESSMENT/INTERVENTIONS     Upon arrival in room patient found resting on 12L HFNC. Patient voices no respiratory issues at this time and was weaned down to 8LHFNC with no immediate changes in O2 saturations. Patient will be continually monitored.    Pulse: 75 Respiratory rate: 14 SpO2: 99  Level of Consciousness: Level of Consciousness (AVPU): alert  Respiratory Effort: Respiratory Effort: Normal, Unlabored Expansion/Accessory Muscle Usage: Expansion/Accessory Muscles/Retractions: no use of accessory muscles, expansion symmetric, no retractions  All Lung Field Breath Sounds: All Lung Fields Breath Sounds: Anterior:, Lateral:, diminished, clear  NISHA Breath Sounds: diminished, clear  LLL Breath Sounds: diminished, clear  RUL Breath Sounds: diminished, clear  RML Breath Sounds: diminished, clear  RLL Breath Sounds: diminished, clear  O2 Device/Concentration: 12HFNC  Was the O2 device able to be weaned? (Yes/No): Yes - 8L HFNC  Ambu at bedside: Ambu bag with the patient?: Yes, Adult Ambu    Current Respiratory Care Orders:   Start   Ordered   20 0800  Incentive spirometry Every 4 hours (5 of 22 released)    Release   References: [facility] Clinical Practice Guidelines    20 0746   20 0800  ACAPELLA TREATMENT Q4H Every 4 hours (5 of 22 released)    Release    20 0746   20 0800  Inhalation Treatment Q4H WAKE Every 4 hours while awake (4 of 16 released)    Release    20 0747   20 0400  Pulse Oximetry Q4H Every 4 hours (6 of 23 released)    Release    20 0246   20 0103  Oxygen Continuous Continuous     References: Oxygen Titration Protocol   Question Answer Comment   Device  type: High flow    Device: High Flow Nasal Cannula (6 -15 Liters)    LPM: 12    Titrate O2 per Oxygen Titration Protocol: Yes    Notify MD of: Inability to achieve desired SpO2        11/04/20 0104   11/04/20 0000  Incentive spirometry Every 4 hours (7 of 24 released)    Release   References: [facility] Clinical Practice Guidelines             RECOMMENDATIONS     We recommend: Continual weaning of oxygenation status as appropriate. Continual monitoring of respiratory needs.    ESCALATION      Physician Escalation (Yes/No) No   Care discussed with primary RTKATERYNA RRT     FOLLOW-UP     Please call back the Rapid Response RTBubba, RRT at x [phone] for any questions or concerns.

## 2020-11-04 NOTE — ASSESSMENT & PLAN NOTE
Patient is 51 yo M with right rib fractures 7-11 with ribs 8-10 having two fracture points and jacky displacement at the anterior/lateral fracture    Will admit to general surgery  CT chest non con ordered with 3D reconstruction to eval for other fractures  NPO  IVF  Multimodal pain control  Will check another CXR in the AM to eval for any pneumo

## 2020-11-04 NOTE — ED TRIAGE NOTES
Jalen Iniguez, a 50 y.o. male presents to the ED w/ complaint of fall    Triage note: Pt presents for right sided flank pain after slip and fall in bathtub at home. Pt said it was too wet and he slipped and landed the right side of his body on the side of the bathtub. Pt reports 10/10. Hx of stroke with right sided weakness  Chief Complaint   Patient presents with    Fall     Pt arrives via EMS after falling out of shower. Pt c/o right side and rib pain. Hx of stroke with right sided deficit.     Review of patient's allergies indicates:  No Known Allergies  Past Medical History:   Diagnosis Date    Psoriasis     Stroke 04/2017     Patient Identifiers for Jalen Iniguez checked and correct  LOC: The patient is awake, alert and aware of environment with an appropriate affect, the patient is oriented x 3 and speaking appropriate.  APPEARANCE: Patient resting comfortably and in no acute distress, patient is clean and well groomed, patient's clothing is properly fastened.  SKIN: The skin is warm and dry, patient has normal skin turgor and moist mucus membranes,no rashes or lesions.Skin Intact , No Breakdown Noted  Musculoskeletal :  Normal range of motion noted. Moves all extremeties well, No swelling or tenderness noted  RESPIRATORY: Airway is open and patent, respirations are spontaneous, patient has a normal effort and rate.  CARDIAC: Patient has a normal rate and rhythm, no periphreal edema noted, capillary refill < 3 seconds.   ABDOMEN: Soft and non tender to palpation, no distention noted.   PULSES: 2+  And symmetrical in all extremeties  NEUROLOGIC: PERRL,  facial expression is symmetrical, patient moving all extremities. Pt has weakness and and numbness to the right upper and lower extremity as residual from stroke   Will continue to monitor

## 2020-11-04 NOTE — PLAN OF CARE
Problem: Adult Inpatient Plan of Care  Goal: Plan of Care Review  Outcome: Ongoing, Progressing     Problem: Adult Inpatient Plan of Care  Goal: Patient-Specific Goal (Individualization)  Outcome: Ongoing, Progressing     Problem: Adult Inpatient Plan of Care  Goal: Optimal Comfort and Wellbeing  Outcome: Ongoing, Progressing    POC reviewed with pt, pt verbalized understanding. Pt is AAOx4. VSS. No falls or injury noted on shift. No s/s of breakdown as pt is independent with positioning self. Pain being monitored and controlled with PRN and scheduled meds. Pt on 12L high flow oxygen, tolerating well. Frequent rounds made for pt safety and care. Safety precautions remain, bed in lowest position, side rails up x2, call light within reach. Will continue to monitor.

## 2020-11-04 NOTE — HPI
Patient is 51 yo M with history of HTN and CVA (2017) with mild residual right sided weakness who presented to ED tonight after falling getting out of bath tube and developing right sided chest pain. Denies any head trauma or LOC. Denies any other injuries. Is not on blood thinners.    CT A/P showed a very low volume right hemopneumothorax    Patient cares for himself and his mother at home    Hx of lumbar spinal surgery  Chris any abd or throacic surgeries

## 2020-11-05 VITALS
SYSTOLIC BLOOD PRESSURE: 131 MMHG | TEMPERATURE: 98 F | HEART RATE: 108 BPM | DIASTOLIC BLOOD PRESSURE: 90 MMHG | HEIGHT: 74 IN | RESPIRATION RATE: 15 BRPM | WEIGHT: 240 LBS | BODY MASS INDEX: 30.8 KG/M2 | OXYGEN SATURATION: 97 %

## 2020-11-05 LAB
ALBUMIN SERPL BCP-MCNC: 3 G/DL (ref 3.5–5.2)
ALP SERPL-CCNC: 97 U/L (ref 55–135)
ALT SERPL W/O P-5'-P-CCNC: 26 U/L (ref 10–44)
ANION GAP SERPL CALC-SCNC: 5 MMOL/L (ref 8–16)
AST SERPL-CCNC: 26 U/L (ref 10–40)
BASOPHILS # BLD AUTO: 0 K/UL (ref 0–0.2)
BASOPHILS # BLD AUTO: 0.01 K/UL (ref 0–0.2)
BASOPHILS NFR BLD: 0 % (ref 0–1.9)
BASOPHILS NFR BLD: 0.3 % (ref 0–1.9)
BILIRUB SERPL-MCNC: 0.5 MG/DL (ref 0.1–1)
BUN SERPL-MCNC: 10 MG/DL (ref 6–20)
CALCIUM SERPL-MCNC: 8.7 MG/DL (ref 8.7–10.5)
CHLORIDE SERPL-SCNC: 104 MMOL/L (ref 95–110)
CO2 SERPL-SCNC: 27 MMOL/L (ref 23–29)
CREAT SERPL-MCNC: 0.7 MG/DL (ref 0.5–1.4)
DIFFERENTIAL METHOD: ABNORMAL
DIFFERENTIAL METHOD: ABNORMAL
EOSINOPHIL # BLD AUTO: 0 K/UL (ref 0–0.5)
EOSINOPHIL # BLD AUTO: 0 K/UL (ref 0–0.5)
EOSINOPHIL NFR BLD: 0 % (ref 0–8)
EOSINOPHIL NFR BLD: 1 % (ref 0–8)
ERYTHROCYTE [DISTWIDTH] IN BLOOD BY AUTOMATED COUNT: 16 % (ref 11.5–14.5)
ERYTHROCYTE [DISTWIDTH] IN BLOOD BY AUTOMATED COUNT: 16.1 % (ref 11.5–14.5)
EST. GFR  (AFRICAN AMERICAN): >60 ML/MIN/1.73 M^2
EST. GFR  (NON AFRICAN AMERICAN): >60 ML/MIN/1.73 M^2
GLUCOSE SERPL-MCNC: 87 MG/DL (ref 70–110)
HCT VFR BLD AUTO: 32.1 % (ref 40–54)
HCT VFR BLD AUTO: 32.3 % (ref 40–54)
HGB BLD-MCNC: 10 G/DL (ref 14–18)
HGB BLD-MCNC: 10.1 G/DL (ref 14–18)
IMM GRANULOCYTES # BLD AUTO: 0.01 K/UL (ref 0–0.04)
IMM GRANULOCYTES # BLD AUTO: 0.01 K/UL (ref 0–0.04)
IMM GRANULOCYTES NFR BLD AUTO: 0.3 % (ref 0–0.5)
IMM GRANULOCYTES NFR BLD AUTO: 0.3 % (ref 0–0.5)
LYMPHOCYTES # BLD AUTO: 0.7 K/UL (ref 1–4.8)
LYMPHOCYTES # BLD AUTO: 0.7 K/UL (ref 1–4.8)
LYMPHOCYTES NFR BLD: 22.6 % (ref 18–48)
LYMPHOCYTES NFR BLD: 24 % (ref 18–48)
MCH RBC QN AUTO: 24.3 PG (ref 27–31)
MCH RBC QN AUTO: 24.3 PG (ref 27–31)
MCHC RBC AUTO-ENTMCNC: 31.2 G/DL (ref 32–36)
MCHC RBC AUTO-ENTMCNC: 31.3 G/DL (ref 32–36)
MCV RBC AUTO: 78 FL (ref 82–98)
MCV RBC AUTO: 78 FL (ref 82–98)
MONOCYTES # BLD AUTO: 0.3 K/UL (ref 0.3–1)
MONOCYTES # BLD AUTO: 0.3 K/UL (ref 0.3–1)
MONOCYTES NFR BLD: 10.9 % (ref 4–15)
MONOCYTES NFR BLD: 9.7 % (ref 4–15)
NEUTROPHILS # BLD AUTO: 1.9 K/UL (ref 1.8–7.7)
NEUTROPHILS # BLD AUTO: 2 K/UL (ref 1.8–7.7)
NEUTROPHILS NFR BLD: 64.5 % (ref 38–73)
NEUTROPHILS NFR BLD: 66.4 % (ref 38–73)
NRBC BLD-RTO: 0 /100 WBC
NRBC BLD-RTO: 0 /100 WBC
PLATELET # BLD AUTO: 170 K/UL (ref 150–350)
PLATELET # BLD AUTO: 177 K/UL (ref 150–350)
PMV BLD AUTO: 10.5 FL (ref 9.2–12.9)
PMV BLD AUTO: 11.2 FL (ref 9.2–12.9)
POTASSIUM SERPL-SCNC: 4.4 MMOL/L (ref 3.5–5.1)
PROT SERPL-MCNC: 5.9 G/DL (ref 6–8.4)
RBC # BLD AUTO: 4.12 M/UL (ref 4.6–6.2)
RBC # BLD AUTO: 4.16 M/UL (ref 4.6–6.2)
SODIUM SERPL-SCNC: 136 MMOL/L (ref 136–145)
WBC # BLD AUTO: 2.88 K/UL (ref 3.9–12.7)
WBC # BLD AUTO: 3.04 K/UL (ref 3.9–12.7)

## 2020-11-05 PROCEDURE — 94799 UNLISTED PULMONARY SVC/PX: CPT | Mod: HCWC

## 2020-11-05 PROCEDURE — G0378 HOSPITAL OBSERVATION PER HR: HCPCS | Mod: HCWC

## 2020-11-05 PROCEDURE — 27000221 HC OXYGEN, UP TO 24 HOURS: Mod: HCWC

## 2020-11-05 PROCEDURE — 85025 COMPLETE CBC W/AUTO DIFF WBC: CPT | Mod: HCWC

## 2020-11-05 PROCEDURE — 80053 COMPREHEN METABOLIC PANEL: CPT | Mod: HCWC

## 2020-11-05 PROCEDURE — 63600175 PHARM REV CODE 636 W HCPCS: Mod: HCWC | Performed by: STUDENT IN AN ORGANIZED HEALTH CARE EDUCATION/TRAINING PROGRAM

## 2020-11-05 PROCEDURE — 25000003 PHARM REV CODE 250: Mod: HCWC | Performed by: STUDENT IN AN ORGANIZED HEALTH CARE EDUCATION/TRAINING PROGRAM

## 2020-11-05 PROCEDURE — 99224 PR SUBSEQUENT OBSERVATION CARE,LEVEL I: ICD-10-PCS | Mod: HCWC,,, | Performed by: SURGERY

## 2020-11-05 PROCEDURE — 94664 DEMO&/EVAL PT USE INHALER: CPT | Mod: HCWC

## 2020-11-05 PROCEDURE — 94761 N-INVAS EAR/PLS OXIMETRY MLT: CPT | Mod: HCWC

## 2020-11-05 PROCEDURE — 99900035 HC TECH TIME PER 15 MIN (STAT): Mod: HCWC

## 2020-11-05 PROCEDURE — 96372 THER/PROPH/DIAG INJ SC/IM: CPT

## 2020-11-05 PROCEDURE — 99224 PR SUBSEQUENT OBSERVATION CARE,LEVEL I: CPT | Mod: HCWC,,, | Performed by: SURGERY

## 2020-11-05 PROCEDURE — 36415 COLL VENOUS BLD VENIPUNCTURE: CPT | Mod: HCWC

## 2020-11-05 RX ORDER — OXYCODONE AND ACETAMINOPHEN 5; 325 MG/1; MG/1
1 TABLET ORAL EVERY 4 HOURS PRN
Qty: 30 EACH | Refills: 0 | Status: SHIPPED | OUTPATIENT
Start: 2020-11-05 | End: 2020-11-05 | Stop reason: SDUPTHER

## 2020-11-05 RX ORDER — DOCUSATE SODIUM 100 MG/1
100 CAPSULE, LIQUID FILLED ORAL 2 TIMES DAILY
Qty: 20 CAPSULE | Refills: 0 | Status: SHIPPED | OUTPATIENT
Start: 2020-11-05 | End: 2020-11-05 | Stop reason: SDUPTHER

## 2020-11-05 RX ORDER — METHOCARBAMOL 500 MG/1
500 TABLET, FILM COATED ORAL 3 TIMES DAILY
Qty: 30 TABLET | Refills: 0 | Status: SHIPPED | OUTPATIENT
Start: 2020-11-05 | End: 2020-11-15

## 2020-11-05 RX ORDER — OXYCODONE AND ACETAMINOPHEN 5; 325 MG/1; MG/1
1 TABLET ORAL EVERY 4 HOURS PRN
Qty: 30 EACH | Refills: 0 | Status: ON HOLD | OUTPATIENT
Start: 2020-11-05 | End: 2022-01-19 | Stop reason: HOSPADM

## 2020-11-05 RX ORDER — DOCUSATE SODIUM 100 MG/1
100 CAPSULE, LIQUID FILLED ORAL 2 TIMES DAILY
Qty: 20 CAPSULE | Refills: 0 | Status: SHIPPED | OUTPATIENT
Start: 2020-11-05 | End: 2020-11-15

## 2020-11-05 RX ORDER — GABAPENTIN 300 MG/1
300 CAPSULE ORAL 3 TIMES DAILY
Qty: 90 CAPSULE | Refills: 0 | Status: ON HOLD | OUTPATIENT
Start: 2020-11-05 | End: 2022-01-19 | Stop reason: HOSPADM

## 2020-11-05 RX ORDER — GABAPENTIN 300 MG/1
300 CAPSULE ORAL 3 TIMES DAILY
Qty: 90 CAPSULE | Refills: 0 | Status: SHIPPED | OUTPATIENT
Start: 2020-11-05 | End: 2020-11-05

## 2020-11-05 RX ORDER — METHOCARBAMOL 500 MG/1
500 TABLET, FILM COATED ORAL 3 TIMES DAILY
Qty: 30 TABLET | Refills: 0 | Status: SHIPPED | OUTPATIENT
Start: 2020-11-05 | End: 2020-11-05

## 2020-11-05 RX ADMIN — ACETAMINOPHEN 650 MG: 325 TABLET ORAL at 06:11

## 2020-11-05 RX ADMIN — GABAPENTIN 300 MG: 300 CAPSULE ORAL at 08:11

## 2020-11-05 RX ADMIN — ACETAMINOPHEN 650 MG: 325 TABLET ORAL at 12:11

## 2020-11-05 RX ADMIN — KETOROLAC TROMETHAMINE 10 MG: 10 TABLET, FILM COATED ORAL at 05:11

## 2020-11-05 RX ADMIN — ACETAMINOPHEN 650 MG: 325 TABLET ORAL at 11:11

## 2020-11-05 RX ADMIN — KETOROLAC TROMETHAMINE 10 MG: 10 TABLET, FILM COATED ORAL at 11:11

## 2020-11-05 RX ADMIN — KETOROLAC TROMETHAMINE 10 MG: 10 TABLET, FILM COATED ORAL at 12:11

## 2020-11-05 RX ADMIN — ACETAMINOPHEN 650 MG: 325 TABLET ORAL at 05:11

## 2020-11-05 RX ADMIN — ENOXAPARIN SODIUM 30 MG: 30 INJECTION SUBCUTANEOUS at 08:11

## 2020-11-05 RX ADMIN — METHOCARBAMOL 500 MG: 500 TABLET ORAL at 02:11

## 2020-11-05 RX ADMIN — METHOCARBAMOL 500 MG: 500 TABLET ORAL at 08:11

## 2020-11-05 RX ADMIN — GABAPENTIN 300 MG: 300 CAPSULE ORAL at 02:11

## 2020-11-05 RX ADMIN — KETOROLAC TROMETHAMINE 10 MG: 10 TABLET, FILM COATED ORAL at 06:11

## 2020-11-05 NOTE — PLAN OF CARE
Problem: Fall Injury Risk  Goal: Absence of Fall and Fall-Related Injury  Intervention: Identify and Manage Contributors to Fall Injury Risk  Flowsheets (Taken 11/5/2020 0333)  Self-Care Promotion:   independence encouraged   BADL personal objects within reach  Medication Review/Management: medications reviewed  Intervention: Promote Injury-Free Environment  Flowsheets (Taken 11/5/2020 0333)  Safety Promotion/Fall Prevention:   assistive device/personal item within reach   commode/urinal/bedpan at bedside   Fall Risk reviewed with patient/family   medications reviewed   nonskid shoes/socks when out of bed   side rails raised x 2  Environmental Safety Modification:   assistive device/personal items within reach   clutter free environment maintained   lighting adjusted     Problem: Adult Inpatient Plan of Care  Goal: Absence of Hospital-Acquired Illness or Injury  Intervention: Identify and Manage Fall Risk  Flowsheets (Taken 11/5/2020 0333)  Safety Promotion/Fall Prevention:   assistive device/personal item within reach   commode/urinal/bedpan at bedside   Fall Risk reviewed with patient/family   medications reviewed   nonskid shoes/socks when out of bed   side rails raised x 2  Intervention: Prevent VTE (venous thromboembolism)  Flowsheets (Taken 11/5/2020 0333)  VTE Prevention/Management: remove, assess skin and reapply sequential compression device  Goal: Optimal Comfort and Wellbeing  Intervention: Provide Person-Centered Care  Flowsheets (Taken 11/5/2020 0333)  Trust Relationship/Rapport:   care explained   choices provided   emotional support provided   questions encouraged   questions answered   empathic listening provided   reassurance provided   thoughts/feelings acknowledged     Problem: Skin Injury Risk Increased  Goal: Skin Health and Integrity  Intervention: Optimize Skin Protection  Flowsheets (Taken 11/5/2020 0333)  Head of Bed (HOB): HOB at 30-45 degrees     AAOx4, vitals documented, no complaints  of pain. NC 4L.  Pt uses incentive spirometer Q hour when awake. SCDS on. Safety measures in place, call bell with in reach. No falls at this time.

## 2020-11-05 NOTE — PLAN OF CARE
Future Appointments   Date Time Provider Department Center   12/16/2020  1:00 PM Garry Robles MD [facility]UR[facility]     Patient discharging home to care of self on 11/5/20.   11/05/20 1405   Final Note   Assessment Type Final Discharge Note   Anticipated Discharge Disposition Home   What phone number can be called within the next 1-3 days to see how you are doing after discharge?   ([phone])   Hospital Follow Up  Appt(s) scheduled? Yes   Discharge plans and expectations educations in teach back method with documentation complete? Yes

## 2020-11-05 NOTE — DISCHARGE SUMMARY
[facility]  General Surgery  Discharge Summary      Patient Name: Jalen Iniguez  MRN: [id number]  Admission Date: 11/3/2020  Hospital Length of Stay: 0 days  Discharge Date and Time:  11/05/2020 11:04 AM  Attending Physician: Gunner Doss MD   Discharging Provider: Stevan Galloway MD  Primary Care Provider: Kasi Duncan MD     HPI:     49 yo M with history of HTN and CVA (2017) with mild residual right sided weakness who presented to ED tonight after falling getting out of bath tube and developing right sided chest pain. Denies any head trauma or LOC. Denies any other injuries. Is not on blood thinners.     CT A/P showed a very low volume right hemopneumothorax     Patient cares for himself and his mother at home     Hx of lumbar spinal surgery  Chris any abd or throacic surgeries    * No surgery found *     Hospital Course:     Patient was admitted after a fall. He was found to have right sided mildly displaced rib fractures and admitted early AM of 11/4 for observation. Over the next 24 hours his pain was controlled with PO medication and he was slowly weaned off supplemental oxygen. His hemoglobin dropped from 12.1 to 10.1. It was rechecked and stable. He was discharged on 11/5.    Physical Exam  Vitals signs and nursing note reviewed.   Constitutional:       General: He is not in acute distress.     Appearance: Normal appearance. He is well-developed. He is not ill-appearing, toxic-appearing or diaphoretic.    Neck:      Musculoskeletal: Normal range of motion and neck supple.   Cardiovascular:      Rate and Rhythm: Normal rate and regular rhythm.      Pulses: Normal pulses.   Pulmonary:      Effort: Pulmonary effort is normal. No respiratory distress.      Comments: Right lower chest wall tenderness, no crepitus or flail chest noted  Chest:      Chest wall: Minimal tenderness present.   Abdominal:      General: Abdomen is flat. There is no distension.      Palpations: Abdomen  is soft.      Tenderness: There is no abdominal tenderness. There is no guarding.      Comments: No bruising noted, abd soft, non tender, non distended   Skin:     General: Skin is warm and dry.   Neurological:      General: No focal deficit present.      Mental Status: He is alert and oriented to person, place, and time. Mental status is at baseline.      Cranial Nerves: No cranial nerve deficit.      Sensory: No sensory deficit.   Psychiatric:         Mood and Affect: Mood normal.         Behavior: Behavior normal.         Thought Content: Thought content normal.         Judgment: Judgment normal.     Consults:   Consults (From admission, onward)        Status Ordering Provider     Inpatient consult to General surgery  Once     Provider:  (Not yet assigned)    Completed MADELIN MEIER          Significant Diagnostic Studies: CT scan    Impression:     Redemonstration of acute mildly displaced fractures of the posterior right 9 through 11th ribs and anterior right 7th through 10th ribs.  Mildly increased volume of right-sided pleural effusion likely representing hemothorax.  Redemonstration of trace pneumothorax.  Persistent subcutaneous emphysema along the dorsal and ventral soft tissues adjacent to the rib fractures.       Pending Diagnostic Studies:     Procedure Component Value Units Date/Time    CBC Auto Differential [[id number]]     Order Status: Sent Lab Status: No result     Specimen: Blood         Final Active Diagnoses:    Diagnosis Date Noted POA    PRINCIPAL PROBLEM:  Multiple closed fractures of ribs of right side [S22.41XA] 11/04/2020 Yes    Closed fracture of multiple ribs of right side [S22.41XA] 11/04/2020 Yes      Problems Resolved During this Admission:      Discharged Condition: good    Disposition: Home or Self Care    Follow Up:  Follow-up Information     Call Kasi Duncan MD.    Specialty: Family Medicine  Why: As needed p [phone]  Contact information:  [address]  [place]  [facility] [address]             Garry Robles MD On 12/16/2020.    Specialty: Neurology  Why: Wednesday 12/16 @ 1pm  Contact information:  [address]  [address]  [place] [address]  [phone]             [facility] On 11/9/2020.    Why: Monday 11/9 @ 10am (Rescheduled 11/5 appt)  Contact information:  [address] [facility] [place] [address]  p [phone]           Gunner Doss MD. Schedule an appointment as soon as possible for a visit in 2 weeks.    Specialty: General Surgery  Why: hospital follow up  Contact information:  [address]  [facility] [address]  [phone]                 Patient Instructions:      Notify your health care provider if you experience any of the following:  temperature >100.4     Notify your health care provider if you experience any of the following:  severe uncontrolled pain     Notify your health care provider if you experience any of the following:  difficulty breathing or increased cough     Notify your health care provider if you experience any of the following:  persistent dizziness, light-headedness, or visual disturbances     Notify your health care provider if you experience any of the following:  increased confusion or weakness     No dressing needed     Activity as tolerated     Medications:  Reconciled Home Medications:      Medication List      START taking these medications    docusate sodium 100 MG capsule  Commonly known as: COLACE  Take 1 capsule (100 mg total) by mouth 2 (two) times daily. for 10 days     gabapentin 300 MG capsule  Commonly known as: NEURONTIN  Take 1 capsule (300 mg total) by mouth 3 (three) times daily.     methocarbamoL 500 MG Tab  Commonly known as: ROBAXIN  Take 1 tablet (500 mg total) by mouth 3 (three) times daily. for 10 days        CHANGE how you take these medications    oxyCODONE-acetaminophen 5-325 mg per tablet  Commonly known as: PERCOCET  Take 1 tablet by mouth every 4 (four) hours as needed for  Pain.  What changed: reasons to take this        STOP taking these medications    permethrin 5 % cream  Commonly known as: ELIMITE            Stevan Galloway MD  General Surgery  [facility]

## 2020-11-05 NOTE — NURSING
Discharge Note: Pt ready for discharge. Discharge instructions given and explained to pt, pt verbalized understanding. Prescriptions delivered to pt's bedside. IV removed. No further questions from pt at this time. Pt to be discharged via wheelchair. Will cont to monitor until discharge.

## 2020-11-05 NOTE — PLAN OF CARE
POC reviewed with pt, pt verbalized understanding. Pt is AAOx4. VSS. No falls or injury as pt calls for assistance when needed. No s/s of breakdown as pt is independent with positioning self. Pt states he has little pain and refused any PRN pain medication. Pt ambulated in the hallway, tolerated well with no complaints of pain or N/V. Frequent rounds made for pt safety and care. Safety precautions remain, bed in lowest position, side rails up x2, call light within reach. Will continue to monitor.

## 2020-11-20 DIAGNOSIS — S27.2XXA TRAUMATIC PNEUMOHEMOTHORAX, INITIAL ENCOUNTER: Primary | ICD-10-CM

## 2020-11-20 DIAGNOSIS — S22.41XA CLOSED FRACTURE OF MULTIPLE RIBS OF RIGHT SIDE, INITIAL ENCOUNTER: ICD-10-CM

## 2020-11-25 ENCOUNTER — HOSPITAL ENCOUNTER (OUTPATIENT)
Dept: RADIOLOGY | Facility: HOSPITAL | Age: 50
Discharge: HOME | End: 2020-11-25
Attending: SURGERY
Payer: MEDICARE

## 2020-11-25 ENCOUNTER — TELEPHONE (OUTPATIENT)
Dept: SURGERY | Facility: CLINIC | Age: 50
End: 2020-11-25

## 2020-11-25 DIAGNOSIS — S27.2XXA TRAUMATIC PNEUMOHEMOTHORAX, INITIAL ENCOUNTER: ICD-10-CM

## 2020-11-25 DIAGNOSIS — S22.41XA CLOSED FRACTURE OF MULTIPLE RIBS OF RIGHT SIDE, INITIAL ENCOUNTER: ICD-10-CM

## 2020-11-25 PROCEDURE — 71046 X-RAY EXAM CHEST 2 VIEWS: CPT | Mod: TC,HCWC

## 2020-11-25 PROCEDURE — 71046 X-RAY EXAM CHEST 2 VIEWS: CPT | Mod: 26,HCWC,, | Performed by: RADIOLOGY

## 2020-11-25 PROCEDURE — 71046 XR CHEST PA AND LATERAL: ICD-10-PCS | Mod: 26,HCWC,, | Performed by: RADIOLOGY

## 2021-01-04 ENCOUNTER — TELEPHONE (OUTPATIENT)
Dept: NEUROLOGY | Facility: CLINIC | Age: 51
End: 2021-01-04

## 2021-12-06 ENCOUNTER — TELEPHONE (OUTPATIENT)
Dept: REHABILITATION | Facility: HOSPITAL | Age: 51
End: 2021-12-06

## 2022-01-15 PROBLEM — A41.9 SEPSIS: Status: ACTIVE | Noted: 2022-01-15

## 2022-01-18 PROBLEM — I10 HYPERTENSION: Status: ACTIVE | Noted: 2022-01-18

## 2022-01-18 PROBLEM — I47.10 SUPRAVENTRICULAR TACHYCARDIA: Status: ACTIVE | Noted: 2022-01-18

## 2022-01-20 PROBLEM — I47.10 SUPRAVENTRICULAR TACHYCARDIA: Status: RESOLVED | Noted: 2022-01-18 | Resolved: 2022-01-20

## 2022-01-20 PROBLEM — B96.20 E. COLI UTI: Status: ACTIVE | Noted: 2022-01-20

## 2022-01-20 PROBLEM — N39.0 E. COLI UTI: Status: ACTIVE | Noted: 2022-01-20

## 2022-01-20 PROBLEM — A41.9 SEPSIS: Status: RESOLVED | Noted: 2022-01-15 | Resolved: 2022-01-20

## 2022-01-20 PROBLEM — R53.1 GENERAL WEAKNESS: Status: ACTIVE | Noted: 2022-01-20

## 2024-06-21 NOTE — TELEPHONE ENCOUNTER
Notified patient after Dr. Doss's review of Chest X-Ray:  Pneumothorax has resolved.  No need for further f/u with General Surgery.  He verbalized understanding.   Received response from Post Acute Services:   Please note, Our ACO Physician, Dr. Luong typically does ACO sign off, is currently out on PTO.  The current Attending Physician, Dr. Saravia is not an ACO Physician; Therefore, cannot sign off under 3DW verification request. If patient is medically cleared, I will have to outreach patient's PCP for ACO sign off which may delay this process.      Kenna Newton RN CM director is included in email chain and can advise if response received this weekend, otherwise will unlikely have response before Monday.     Secure email sent to [facility]- Post Acute Services for 3DW request; pending response 2:55 PM